# Patient Record
Sex: FEMALE | Race: BLACK OR AFRICAN AMERICAN | URBAN - METROPOLITAN AREA
[De-identification: names, ages, dates, MRNs, and addresses within clinical notes are randomized per-mention and may not be internally consistent; named-entity substitution may affect disease eponyms.]

---

## 2022-08-30 PROBLEM — 82934008 CHRONIC IDIOPATHIC CONSTIPATION: Status: ACTIVE | Noted: 2022-08-30

## 2022-08-31 ENCOUNTER — CLAIMS CREATED FROM THE CLAIM WINDOW (OUTPATIENT)
Dept: URBAN - METROPOLITAN AREA CLINIC 92 | Facility: CLINIC | Age: 37
End: 2022-08-31
Payer: OTHER GOVERNMENT

## 2022-08-31 ENCOUNTER — WEB ENCOUNTER (OUTPATIENT)
Dept: URBAN - METROPOLITAN AREA CLINIC 92 | Facility: CLINIC | Age: 37
End: 2022-08-31

## 2022-08-31 VITALS
SYSTOLIC BLOOD PRESSURE: 131 MMHG | BODY MASS INDEX: 25.86 KG/M2 | HEIGHT: 61 IN | WEIGHT: 137 LBS | TEMPERATURE: 97.1 F | HEART RATE: 113 BPM | DIASTOLIC BLOOD PRESSURE: 94 MMHG

## 2022-08-31 DIAGNOSIS — R14.0 ABDOMINAL BLOATING: ICD-10-CM

## 2022-08-31 DIAGNOSIS — K58.1 IRRITABLE BOWEL SYNDROME WITH CONSTIPATION: ICD-10-CM

## 2022-08-31 PROCEDURE — 99204 OFFICE O/P NEW MOD 45 MIN: CPT | Performed by: INTERNAL MEDICINE

## 2022-08-31 PROCEDURE — 99204 OFFICE O/P NEW MOD 45 MIN: CPT

## 2022-08-31 RX ORDER — LINACLOTIDE 290 UG/1
1 CAPSULE AT LEAST 30 MINUTES BEFORE THE FIRST MEAL OF THE DAY ON AN EMPTY STOMACH CAPSULE, GELATIN COATED ORAL ONCE A DAY
Qty: 90 | Refills: 1 | OUTPATIENT
Start: 2022-08-31 | End: 2023-02-26

## 2022-08-31 NOTE — HPI-TODAY'S VISIT:
37-year-old patient presents today with complaint of constipation and bloating. She states she is always had constipation since childhood and could go weeks without having a BM.  States since she has gotten older she has noticed this has worsened over time.  She is currently only having BM with laxative use. She uses laxatives once every 1 to 2 weeks.  She has tried to increase water and fiber, probiotics, digestive enzymes, MiraLAX which are not helping.  She also has generalized abdominal pain and bloating which feels somewhat better with a BM.  She has had this for a few years but states it has gotten somewhat worse.  Denies increased stress, new medications, melena, hematochezia, unexpected weight loss.  She had a colonoscopy a few years ago due to these sx which was normal-unknown where this was performed.  She denies a family h/o of colon cancer, colon polyps, UC , Crohn's.  She denies any upper GI symptoms.

## 2022-09-01 LAB
IMMUNOGLOBULIN A, QN, SERUM: 150
INTERPRETATION: (no result)
T-TRANSGLUTAMINASE (TTG) IGA: <1
TSH W/REFLEX TO FT4: 1.48

## 2022-09-12 ENCOUNTER — TELEPHONE ENCOUNTER (OUTPATIENT)
Dept: URBAN - METROPOLITAN AREA CLINIC 7 | Facility: CLINIC | Age: 37
End: 2022-09-12

## 2022-09-12 RX ORDER — LINACLOTIDE 290 UG/1
1 CAPSULE AT LEAST 30 MINUTES BEFORE THE FIRST MEAL OF THE DAY ON AN EMPTY STOMACH CAPSULE, GELATIN COATED ORAL ONCE A DAY
Qty: 90 | Refills: 1 | OUTPATIENT
Start: 2022-09-12 | End: 2023-03-10

## 2022-09-12 RX ORDER — LINACLOTIDE 290 UG/1
1 CAPSULE AT LEAST 30 MINUTES BEFORE THE FIRST MEAL OF THE DAY ON AN EMPTY STOMACH CAPSULE, GELATIN COATED ORAL ONCE A DAY
Qty: 90 | Refills: 1 | Status: ACTIVE | COMMUNITY
Start: 2022-08-31 | End: 2023-02-26

## 2022-10-31 ENCOUNTER — OFFICE VISIT (OUTPATIENT)
Dept: URBAN - METROPOLITAN AREA CLINIC 92 | Facility: CLINIC | Age: 37
End: 2022-10-31

## 2022-11-30 ENCOUNTER — OFFICE VISIT (OUTPATIENT)
Dept: URBAN - METROPOLITAN AREA CLINIC 92 | Facility: CLINIC | Age: 37
End: 2022-11-30

## 2022-11-30 RX ORDER — LINACLOTIDE 290 UG/1
1 CAPSULE AT LEAST 30 MINUTES BEFORE THE FIRST MEAL OF THE DAY ON AN EMPTY STOMACH CAPSULE, GELATIN COATED ORAL ONCE A DAY
Qty: 90 | Refills: 1 | COMMUNITY
Start: 2022-09-12 | End: 2023-03-10

## 2022-11-30 RX ORDER — LINACLOTIDE 290 UG/1
1 CAPSULE AT LEAST 30 MINUTES BEFORE THE FIRST MEAL OF THE DAY ON AN EMPTY STOMACH CAPSULE, GELATIN COATED ORAL ONCE A DAY
Qty: 90 | Refills: 1 | COMMUNITY
Start: 2022-08-31 | End: 2023-02-26

## 2022-12-29 ENCOUNTER — OFFICE VISIT (OUTPATIENT)
Dept: URBAN - METROPOLITAN AREA CLINIC 92 | Facility: CLINIC | Age: 37
End: 2022-12-29

## 2022-12-29 RX ORDER — LINACLOTIDE 290 UG/1
1 CAPSULE AT LEAST 30 MINUTES BEFORE THE FIRST MEAL OF THE DAY ON AN EMPTY STOMACH CAPSULE, GELATIN COATED ORAL ONCE A DAY
Qty: 90 | Refills: 1 | OUTPATIENT

## 2023-01-25 ENCOUNTER — OFFICE VISIT (OUTPATIENT)
Dept: URBAN - METROPOLITAN AREA CLINIC 92 | Facility: CLINIC | Age: 38
End: 2023-01-25
Payer: OTHER GOVERNMENT

## 2023-01-25 ENCOUNTER — WEB ENCOUNTER (OUTPATIENT)
Dept: URBAN - METROPOLITAN AREA CLINIC 92 | Facility: CLINIC | Age: 38
End: 2023-01-25

## 2023-01-25 VITALS
TEMPERATURE: 98.1 F | SYSTOLIC BLOOD PRESSURE: 129 MMHG | DIASTOLIC BLOOD PRESSURE: 96 MMHG | HEART RATE: 86 BPM | WEIGHT: 144 LBS | HEIGHT: 61 IN | BODY MASS INDEX: 27.19 KG/M2

## 2023-01-25 DIAGNOSIS — K21.9 GASTROESOPHAGEAL REFLUX DISEASE WITHOUT ESOPHAGITIS: ICD-10-CM

## 2023-01-25 DIAGNOSIS — R14.0 ABDOMINAL BLOATING: ICD-10-CM

## 2023-01-25 DIAGNOSIS — K58.1 IRRITABLE BOWEL SYNDROME WITH CONSTIPATION: ICD-10-CM

## 2023-01-25 PROBLEM — 266435005: Status: ACTIVE | Noted: 2023-01-25

## 2023-01-25 PROBLEM — 440630006: Status: ACTIVE | Noted: 2022-08-31

## 2023-01-25 PROCEDURE — 99214 OFFICE O/P EST MOD 30 MIN: CPT

## 2023-01-25 RX ORDER — PLECANATIDE 3 MG/1
1 TABLET TABLET ORAL ONCE A DAY
Qty: 90 TABLET | Refills: 1 | OUTPATIENT
Start: 2023-01-25 | End: 2023-07-24

## 2023-01-25 RX ORDER — LINACLOTIDE 290 UG/1
1 CAPSULE AT LEAST 30 MINUTES BEFORE THE FIRST MEAL OF THE DAY ON AN EMPTY STOMACH CAPSULE, GELATIN COATED ORAL ONCE A DAY
Qty: 90 | Refills: 1 | Status: ACTIVE | COMMUNITY

## 2023-01-25 RX ORDER — FAMOTIDINE 40 MG/1
1 TABLET AT BEDTIME AS NEEDED TABLET, FILM COATED ORAL ONCE A DAY
Qty: 30 TABLET | Refills: 2 | OUTPATIENT
Start: 2023-01-25

## 2023-01-25 NOTE — HPI-TODAY'S VISIT:
37-year-old patient presents today with complaint of constipation and bloating.  8/31/22 She states she is always had constipation since childhood and could go weeks without having a BM.  States since she has gotten older she has noticed this has worsened over time.  She is currently only having BM with laxative use. She uses laxatives once every 1 to 2 weeks.  She has tried to increase water and fiber, probiotics, digestive enzymes, MiraLAX which are not helping.  She also has generalized abdominal pain and bloating which feels somewhat better with a BM.  She has had this for a few years but states it has gotten somewhat worse.  Denies increased stress, new medications, melena, hematochezia, unexpected weight loss.  She had a colonoscopy a few years ago due to these sx which was normal-unknown where this was performed.  She denies a family h/o of colon cancer, colon polyps, UC , Crohn's.  She denies any upper GI symptoms.  1/25/23 Currently states she was doing well on Linzess to 290 but few weeks ago she started having more issues of abdominal pain, bloating and constipation.  She states Linzess would make her have a bowel movement every other day but would sometimes have some liquid stool.  She denies any melena or hematochezia.  She does state that her diet has not been great recently as her birthday was last week and after the holidays.  She did try IBgard and FODMAP diet but did not notice much of a difference on this.  She is also still on probiotics. She did not get SIBO kit done.  She also notes some increase in acid reflux recently mainly at night.  She does not take anything for this before.  Denies nausea, vomiting, dysphagia, odynophagia.

## 2023-03-31 ENCOUNTER — OFFICE VISIT (OUTPATIENT)
Dept: URBAN - METROPOLITAN AREA CLINIC 92 | Facility: CLINIC | Age: 38
End: 2023-03-31
Payer: OTHER GOVERNMENT

## 2023-03-31 ENCOUNTER — DASHBOARD ENCOUNTERS (OUTPATIENT)
Age: 38
End: 2023-03-31

## 2023-03-31 VITALS
HEIGHT: 61 IN | SYSTOLIC BLOOD PRESSURE: 142 MMHG | DIASTOLIC BLOOD PRESSURE: 92 MMHG | TEMPERATURE: 97.7 F | HEART RATE: 102 BPM | BODY MASS INDEX: 27.75 KG/M2 | WEIGHT: 147 LBS

## 2023-03-31 DIAGNOSIS — K58.1 IRRITABLE BOWEL SYNDROME WITH CONSTIPATION: ICD-10-CM

## 2023-03-31 DIAGNOSIS — K21.9 GASTROESOPHAGEAL REFLUX DISEASE WITHOUT ESOPHAGITIS: ICD-10-CM

## 2023-03-31 DIAGNOSIS — R14.0 ABDOMINAL BLOATING: ICD-10-CM

## 2023-03-31 PROCEDURE — 99213 OFFICE O/P EST LOW 20 MIN: CPT

## 2023-03-31 RX ORDER — FAMOTIDINE 40 MG/1
1 TABLET AT BEDTIME AS NEEDED TABLET, FILM COATED ORAL ONCE A DAY
Qty: 30 TABLET | Refills: 2 | Status: ACTIVE | COMMUNITY
Start: 2023-01-25

## 2023-03-31 RX ORDER — PLECANATIDE 3 MG/1
1 TABLET TABLET ORAL ONCE A DAY
Qty: 90 TABLET | Refills: 1 | OUTPATIENT

## 2023-03-31 RX ORDER — LINACLOTIDE 290 UG/1
1 CAPSULE AT LEAST 30 MINUTES BEFORE THE FIRST MEAL OF THE DAY ON AN EMPTY STOMACH CAPSULE, GELATIN COATED ORAL ONCE A DAY
Qty: 90 | Refills: 1 | Status: ACTIVE | COMMUNITY

## 2023-03-31 RX ORDER — FAMOTIDINE 40 MG/1
1 TABLET AT BEDTIME AS NEEDED TABLET, FILM COATED ORAL ONCE A DAY
Qty: 90 TABLET | Refills: 1 | OUTPATIENT

## 2023-03-31 RX ORDER — PLECANATIDE 3 MG/1
1 TABLET TABLET ORAL ONCE A DAY
Qty: 90 TABLET | Refills: 1 | Status: ACTIVE | COMMUNITY
Start: 2023-01-25 | End: 2023-07-24

## 2023-03-31 NOTE — HPI-TODAY'S VISIT:
37-year-old patient presents today with complaint of constipation and bloating.  8/31/22 She states she is always had constipation since childhood and could go weeks without having a BM.  States since she has gotten older she has noticed this has worsened over time.  She is currently only having BM with laxative use. She uses laxatives once every 1 to 2 weeks.  She has tried to increase water and fiber, probiotics, digestive enzymes, MiraLAX which are not helping.  She also has generalized abdominal pain and bloating which feels somewhat better with a BM.  She has had this for a few years but states it has gotten somewhat worse.  Denies increased stress, new medications, melena, hematochezia, unexpected weight loss.  She had a colonoscopy a few years ago due to these sx which was normal-unknown where this was performed.  She denies a family h/o of colon cancer, colon polyps, UC , Crohn's.  She denies any upper GI symptoms.  1/25/23 Currently states she was doing well on Linzess to 290 but few weeks ago she started having more issues of abdominal pain, bloating and constipation.  She states Linzess would make her have a bowel movement every other day but would sometimes have some liquid stool.  She denies any melena or hematochezia.  She does state that her diet has not been great recently as her birthday was last week and after the holidays.  She did try IBgard and FODMAP diet but did not notice much of a difference on this.  She is also still on probiotics. She did not get SIBO kit done.  She also notes some increase in acid reflux recently mainly at night.  She does not take anything for this before.  Denies nausea, vomiting, dysphagia, odynophagia.  3/31/23 Patient did a trial of Trulance 3mg and this helped with her constipation. She was not able to obtain the rx however due to cost. She states she went to her local [facility] and did not speak with the specialty pharmacy. She has still not done the SIBO kit and has bloating. Pepcid harshadn has been helping with gerd sx

## 2023-04-04 ENCOUNTER — TELEPHONE ENCOUNTER (OUTPATIENT)
Dept: URBAN - METROPOLITAN AREA CLINIC 92 | Facility: CLINIC | Age: 38
End: 2023-04-04

## 2023-04-04 RX ORDER — FAMOTIDINE 40 MG/1
1 TABLET AT BEDTIME AS NEEDED TABLET, FILM COATED ORAL ONCE A DAY
Qty: 90 TABLET | Refills: 1 | Status: ACTIVE | COMMUNITY

## 2023-04-04 RX ORDER — TENAPANOR HYDROCHLORIDE 53.2 MG/1
1 TABLET IMMEDIATELY BEFORE MEALS TABLET ORAL TWICE A DAY
Qty: 180 TABLET | Refills: 0 | OUTPATIENT
Start: 2023-04-04 | End: 2023-07-03

## 2023-04-04 RX ORDER — PLECANATIDE 3 MG/1
1 TABLET TABLET ORAL ONCE A DAY
Qty: 90 TABLET | Refills: 1 | Status: ACTIVE | COMMUNITY

## 2023-04-04 RX ORDER — LINACLOTIDE 290 UG/1
1 CAPSULE AT LEAST 30 MINUTES BEFORE THE FIRST MEAL OF THE DAY ON AN EMPTY STOMACH CAPSULE, GELATIN COATED ORAL ONCE A DAY
Qty: 90 | Refills: 1 | Status: ACTIVE | COMMUNITY

## 2023-06-30 ENCOUNTER — OFFICE VISIT (OUTPATIENT)
Dept: URBAN - METROPOLITAN AREA CLINIC 92 | Facility: CLINIC | Age: 38
End: 2023-06-30

## 2023-06-30 RX ORDER — FAMOTIDINE 40 MG/1
1 TABLET AT BEDTIME AS NEEDED TABLET, FILM COATED ORAL ONCE A DAY
Qty: 90 TABLET | Refills: 1 | OUTPATIENT

## 2023-06-30 RX ORDER — PLECANATIDE 3 MG/1
1 TABLET TABLET ORAL ONCE A DAY
Qty: 90 TABLET | Refills: 1 | OUTPATIENT

## 2023-06-30 NOTE — HPI-TODAY'S VISIT:
37-year-old patient presents today with complaint of constipation and bloating.  8/31/22 She states she is always had constipation since childhood and could go weeks without having a BM.  States since she has gotten older she has noticed this has worsened over time.  She is currently only having BM with laxative use. She uses laxatives once every 1 to 2 weeks.  She has tried to increase water and fiber, probiotics, digestive enzymes, MiraLAX which are not helping.  She also has generalized abdominal pain and bloating which feels somewhat better with a BM.  She has had this for a few years but states it has gotten somewhat worse.  Denies increased stress, new medications, melena, hematochezia, unexpected weight loss.  She had a colonoscopy a few years ago due to these sx which was normal-unknown where this was performed.  She denies a family h/o of colon cancer, colon polyps, UC , Crohn's.  She denies any upper GI symptoms.  1/25/23 Currently states she was doing well on Linzess to 290 but few weeks ago she started having more issues of abdominal pain, bloating and constipation.  She states Linzess would make her have a bowel movement every other day but would sometimes have some liquid stool.  She denies any melena or hematochezia.  She does state that her diet has not been great recently as her birthday was last week and after the holidays.  She did try IBgard and FODMAP diet but did not notice much of a difference on this.  She is also still on probiotics. She did not get SIBO kit done.  She also notes some increase in acid reflux recently mainly at night.  She does not take anything for this before.  Denies nausea, vomiting, dysphagia, odynophagia.  3/31/23 Patient did a trial of Trulance 3mg and this helped with her constipation. She was not able to obtain the rx however due to cost. She states she went to her local [facility] and did not speak with the specialty pharmacy. She has still not done the SIBO kit and has bloating. Pepcid prn has been helping with gerd sx After last visit patient was still not on Cipro.  She was switched to Xarelto due to insurance issues and?

## 2023-08-11 ENCOUNTER — OFFICE VISIT (OUTPATIENT)
Dept: URBAN - METROPOLITAN AREA CLINIC 92 | Facility: CLINIC | Age: 38
End: 2023-08-11

## 2023-08-11 NOTE — HPI-TODAY'S VISIT:
37-year-old patient presents today with complaint of constipation and bloating.  8/31/22 She states she is always had constipation since childhood and could go weeks without having a BM.  States since she has gotten older she has noticed this has worsened over time.  She is currently only having BM with laxative use. She uses laxatives once every 1 to 2 weeks.  She has tried to increase water and fiber, probiotics, digestive enzymes, MiraLAX which are not helping.  She also has generalized abdominal pain and bloating which feels somewhat better with a BM.  She has had this for a few years but states it has gotten somewhat worse.  Denies increased stress, new medications, melena, hematochezia, unexpected weight loss.  She had a colonoscopy a few years ago due to these sx which was normal-unknown where this was performed.  She denies a family h/o of colon cancer, colon polyps, UC , Crohn's.  She denies any upper GI symptoms.  1/25/23 Currently states she was doing well on Linzess to 290 but few weeks ago she started having more issues of abdominal pain, bloating and constipation.  She states Linzess would make her have a bowel movement every other day but would sometimes have some liquid stool.  She denies any melena or hematochezia.  She does state that her diet has not been great recently as her birthday was last week and after the holidays.  She did try IBgard and FODMAP diet but did not notice much of a difference on this.  She is also still on probiotics. She did not get SIBO kit done.  She also notes some increase in acid reflux recently mainly at night.  She does not take anything for this before.  Denies nausea, vomiting, dysphagia, odynophagia.  3/31/23 Patient did a trial of Trulance 3mg and this helped with her constipation. She was not able to obtain the rx however due to cost. She states she went to her local [facility] and did not speak with the specialty pharmacy. She has still not done the SIBO kit and has bloating. Pepcid prn has been helping with gerd sx After last visit patient was still not on Cipro.  She was switched to Xarelto due to insurance issues and?

## 2023-08-14 ENCOUNTER — TELEPHONE ENCOUNTER (OUTPATIENT)
Dept: URBAN - METROPOLITAN AREA CLINIC 92 | Facility: CLINIC | Age: 38
End: 2023-08-14

## 2023-08-14 RX ORDER — TENAPANOR HYDROCHLORIDE 53.2 MG/1
1 TABLET IMMEDIATELY BEFORE MEALS TABLET ORAL TWICE A DAY
Qty: 180 TABLET | Refills: 0
Start: 2023-04-04 | End: 2023-11-12

## 2023-10-09 ENCOUNTER — ERX REFILL RESPONSE (OUTPATIENT)
Dept: URBAN - METROPOLITAN AREA CLINIC 92 | Facility: CLINIC | Age: 38
End: 2023-10-09

## 2023-10-09 RX ORDER — FAMOTIDINE 40 MG/1
1 TABLET AT BEDTIME AS NEEDED TABLET, FILM COATED ORAL ONCE A DAY
Qty: 90 TABLET | Refills: 1 | OUTPATIENT

## 2023-10-09 RX ORDER — FAMOTIDINE 40 MG/1
1 TABLET AT BEDTIME TABLET, FILM COATED ORAL ONCE A DAY
Qty: 90 TABLET | Refills: 1 | OUTPATIENT